# Patient Record
Sex: MALE | Race: WHITE | NOT HISPANIC OR LATINO | Employment: UNEMPLOYED | RURAL
[De-identification: names, ages, dates, MRNs, and addresses within clinical notes are randomized per-mention and may not be internally consistent; named-entity substitution may affect disease eponyms.]

---

## 2017-06-08 ENCOUNTER — OFFICE VISIT (OUTPATIENT)
Dept: RETAIL CLINIC | Facility: CLINIC | Age: 37
End: 2017-06-08

## 2017-06-08 DIAGNOSIS — Z13.9 SCREENING: Primary | ICD-10-CM

## 2017-06-08 NOTE — PROGRESS NOTES
Subjective   Cruz Ponce is a 36 y.o. male.     Reason for Appointment  1. escreen    History of Present Illness  HPI:   See scanned document. See custody control form.    Assessments  1. Encounter for screening, unspecified - Z13.9    This document has been electronically signed by MAN Javier June 8, 2017 9:27 AM

## 2018-02-16 ENCOUNTER — LAB (OUTPATIENT)
Dept: LAB | Facility: HOSPITAL | Age: 38
End: 2018-02-16
Attending: OBSTETRICS & GYNECOLOGY

## 2018-02-16 ENCOUNTER — TRANSCRIBE ORDERS (OUTPATIENT)
Dept: ADMINISTRATIVE | Facility: HOSPITAL | Age: 38
End: 2018-02-16

## 2018-02-16 DIAGNOSIS — N46.9 INFERTILITY MALE: Primary | ICD-10-CM

## 2018-02-16 DIAGNOSIS — N46.9 INFERTILITY MALE: ICD-10-CM

## 2018-02-16 LAB
CHARACTER SMN: ABNORMAL
COLOR SMN: ABNORMAL
FORWARD PROGRESSION: ABNORMAL
ROUND CELLS, SEMINAL FLUID: ABNORMAL /HPF
SPECIMEN VOL SMN: 1.8 ML (ref 2–5)
SPERM # SMN: 21.8 MILLIONS/ML (ref 20–999)
SPERM MOTILE NFR SMN: 57 % MOTILE (ref 41–100)
SPERM PRECURSORS/100 SPERM: 2 /100 SPERM
SPERM SMN: 38 % NORMAL (ref 30–100)
VIABLE CELLS NFR SPEC: ABNORMAL % VIABLE (ref 51–100)
VISC SMN: ABNORMAL CP
WBC/100 SPERM: 2 /100 SPERM

## 2018-02-16 PROCEDURE — 89320 SEMEN ANAL VOL/COUNT/MOT: CPT

## 2019-09-03 ENCOUNTER — OFFICE VISIT (OUTPATIENT)
Dept: NEUROSURGERY | Facility: CLINIC | Age: 39
End: 2019-09-03

## 2019-09-03 VITALS
WEIGHT: 315 LBS | HEIGHT: 72 IN | SYSTOLIC BLOOD PRESSURE: 132 MMHG | TEMPERATURE: 97.1 F | DIASTOLIC BLOOD PRESSURE: 88 MMHG | BODY MASS INDEX: 42.66 KG/M2

## 2019-09-03 DIAGNOSIS — E11.8 TYPE 2 DIABETES MELLITUS WITH COMPLICATION, WITHOUT LONG-TERM CURRENT USE OF INSULIN (HCC): ICD-10-CM

## 2019-09-03 DIAGNOSIS — E66.01 CLASS 3 SEVERE OBESITY DUE TO EXCESS CALORIES WITH SERIOUS COMORBIDITY AND BODY MASS INDEX (BMI) OF 45.0 TO 49.9 IN ADULT (HCC): ICD-10-CM

## 2019-09-03 DIAGNOSIS — G47.30 SLEEP APNEA, UNSPECIFIED TYPE: ICD-10-CM

## 2019-09-03 DIAGNOSIS — M51.36 DEGENERATIVE DISC DISEASE, LUMBAR: ICD-10-CM

## 2019-09-03 DIAGNOSIS — G57.12 MERALGIA PARESTHETICA OF LEFT SIDE: Primary | ICD-10-CM

## 2019-09-03 PROCEDURE — 99203 OFFICE O/P NEW LOW 30 MIN: CPT | Performed by: NEUROLOGICAL SURGERY

## 2019-09-03 RX ORDER — PREGABALIN 75 MG/1
75 CAPSULE ORAL
Qty: 30 CAPSULE | Refills: 0 | Status: SHIPPED | OUTPATIENT
Start: 2019-09-03

## 2019-09-03 RX ORDER — TESTOSTERONE CYPIONATE 200 MG/ML
INJECTION, SOLUTION INTRAMUSCULAR
Refills: 0 | COMMUNITY
Start: 2019-08-20

## 2019-09-03 RX ORDER — ALBUTEROL SULFATE 90 UG/1
AEROSOL, METERED RESPIRATORY (INHALATION)
Refills: 2 | COMMUNITY
Start: 2019-08-23

## 2019-09-03 RX ORDER — IBUPROFEN 600 MG/1
TABLET ORAL
Refills: 1 | COMMUNITY
Start: 2019-08-23

## 2019-09-03 RX ORDER — LISINOPRIL 10 MG/1
TABLET ORAL
Refills: 5 | COMMUNITY
Start: 2019-08-15

## 2019-09-03 NOTE — PROGRESS NOTES
"Cruz Ponce  1980  [id number]      Chief Complaint   Patient presents with   • Back Pain   • Leg Pain     BLE; L>R       HISTORY OF PRESENT ILLNESS: This is a 38-year-old male presenting with a chief complaint of pain in his back with paresthesia and hyperesthesia in his thighs on both sides of 6 to 8 months duration.  Thing climbing stairs walking exercise tend to exacerbate his symptoms.  He is awakened at night with \"pins-and-needles\" in the distribution of the lateral femoral cutaneous nerve.    He also has severe sleep apnea, diabetes continues to smoke.  A lumbar MRI was performed is referred for neurosurgical consultation.    Past Medical History:   Diagnosis Date   • Arthritis    • Emphysema lung (CMS/HCC)    • Hypertension    • Low back pain        Past Surgical History:   Procedure Laterality Date   • NO PAST SURGERIES         Family History   Problem Relation Age of Onset   • COPD Mother    • Heart failure Mother    • Heart failure Father    • Hypertension Father    • Thyroid disease Father        Social History     Socioeconomic History   • Marital status:      Spouse name: Not on file   • Number of children: Not on file   • Years of education: Not on file   • Highest education level: Not on file   Tobacco Use   • Smoking status: Current Every Day Smoker     Packs/day: 0.50   • Smokeless tobacco: Never Used   Substance and Sexual Activity   • Alcohol use: No     Frequency: Never   • Drug use: No   • Sexual activity: Defer       No Known Allergies      Current Outpatient Medications:   •  ANORO ELLIPTA 62.5-25 MCG/INH aerosol powder  inhaler, , Disp: , Rfl: 2  •   MG tablet, , Disp: , Rfl: 1  •  lisinopril (PRINIVIL,ZESTRIL) 10 MG tablet, , Disp: , Rfl: 5  •  metFORMIN (GLUCOPHAGE) 500 MG tablet, , Disp: , Rfl: 0  •  sertraline (ZOLOFT) 50 MG tablet, , Disp: , Rfl: 5  •  Testosterone Cypionate (DEPOTESTOTERONE CYPIONATE) 200 MG/ML injection, , Disp: , Rfl: 0  •  VENTOLIN  (90 " Base) MCG/ACT inhaler, , Disp: , Rfl: 2    Review of Systems   Constitutional: Positive for fatigue. Negative for activity change, appetite change, chills, diaphoresis, fever and unexpected weight change.   HENT: Negative for congestion, dental problem, drooling, ear discharge, ear pain, facial swelling, hearing loss, mouth sores, nosebleeds, postnasal drip, rhinorrhea, sinus pressure, sneezing, sore throat, tinnitus, trouble swallowing and voice change.    Eyes: Negative for photophobia, pain, discharge, redness, itching and visual disturbance.   Respiratory: Positive for apnea, shortness of breath and wheezing. Negative for cough, choking, chest tightness and stridor.    Cardiovascular: Negative for chest pain, palpitations and leg swelling.   Gastrointestinal: Negative for abdominal distention, abdominal pain, anal bleeding, blood in stool, constipation, diarrhea, nausea, rectal pain and vomiting.   Endocrine: Negative for cold intolerance, heat intolerance, polydipsia, polyphagia and polyuria.   Genitourinary: Negative for decreased urine volume, difficulty urinating, dysuria, enuresis, flank pain, frequency, genital sores, hematuria and urgency.   Musculoskeletal: Positive for back pain, myalgias and neck pain. Negative for arthralgias, gait problem, joint swelling and neck stiffness.   Skin: Negative for color change, pallor, rash and wound.   Allergic/Immunologic: Negative for environmental allergies, food allergies and immunocompromised state.   Neurological: Positive for numbness and headaches. Negative for dizziness, tremors, seizures, syncope, facial asymmetry, speech difficulty, weakness and light-headedness.   Hematological: Negative for adenopathy. Does not bruise/bleed easily.   Psychiatric/Behavioral: Negative for agitation, behavioral problems, confusion, decreased concentration, dysphoric mood, hallucinations, self-injury, sleep disturbance and suicidal ideas. The patient is not nervous/anxious and  "is not hyperactive.    All other systems reviewed and are negative.      Vitals:    09/03/19 1218   BP: 132/88   BP Location: Left arm   Patient Position: Sitting   Cuff Size: Large Adult   Temp: 97.1 °F (36.2 °C)   TempSrc: Temporal   Weight: (!) 165 kg (364 lb)   Height: 182.9 cm (72\")       Neurological Examination:    Mental status/speech: The patient is alert and oriented.  Speech is clear without aphysia or dysarthria.  No overt cognitive deficits.    Cranial nerve examination:    Olfaction: Smell is intact.  Vision: Vision is intact without visual field abnormalities.  Funduscopic examination is normal.  No pupillary irregularity.  Ocular motor examination: The extraocular muscles are intact.  There is no diplopia.  The pupil is round and reactive to both light and accommodation.  There is no nystagmus.  Facial movement/sensation: There is no facial weakness.  Sensation is intact in the first, second, and third divisions of the trigeminal nerve.  The corneal reflex is intact.  Auditory: Hearing is intact to finger rub bilaterally.  Cranial nerves IX, X, XI, XII: Phonation is normal.  No dysphagia.  Tongue is protruded in the midline without atrophy.  The gag reflex is intact.  Shoulder shrug is normal.    Musculoligamentous ligamentous examination: He is morbidly obese.  Straight leg raising Lasègue and flip test are negative.  James's test are negative.  There is no weakness sensory loss or reflex asymmetry.  His gait is normal.    Medical Decision Making:     Diagnostic Data Set: The lumbar MRI shows degenerative disc disease L4-L5.  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  He has severe sleep apnea which apparently has " "not been well adjusted or controlled.  Accordingly I have gotten him a \"second opinion\" for his sleep apnea.  He needs referral to bariatric surgery for weight reduction.  I have ordered EMG/NCV and will see him in [place] subsequently.  I have given him a prescription of Lyrica 75 mg at night.  He does not have a neurosurgical abnormality.        I greatly appreciate the opportunity to see and evaluate this individual.  If you have questions or concerns regarding issues that I may have overlooked please call me at any time: [phone].  Ayo Carvalho M.D.  [facility]  [address] [place] [address]  [id number]    Scribed for Hill Carvalho MD by Naz Hernandez CMA. 9/3/2019  12:56 PM     I have read and concur with the information provided by the scribe.  Hill Carvalho MD      "

## 2019-10-14 ENCOUNTER — APPOINTMENT (OUTPATIENT)
Dept: NEUROLOGY | Facility: HOSPITAL | Age: 39
End: 2019-10-14

## 2024-09-23 ENCOUNTER — OFFICE VISIT (OUTPATIENT)
Dept: CARDIOLOGY | Facility: CLINIC | Age: 44
End: 2024-09-23
Payer: MEDICAID

## 2024-09-23 VITALS
WEIGHT: 315 LBS | HEIGHT: 71 IN | SYSTOLIC BLOOD PRESSURE: 152 MMHG | OXYGEN SATURATION: 92 % | DIASTOLIC BLOOD PRESSURE: 83 MMHG | BODY MASS INDEX: 44.1 KG/M2 | HEART RATE: 75 BPM

## 2024-09-23 DIAGNOSIS — G47.30 SLEEP APNEA IN ADULT: ICD-10-CM

## 2024-09-23 DIAGNOSIS — I50.23 ACUTE ON CHRONIC SYSTOLIC CHF (CONGESTIVE HEART FAILURE): ICD-10-CM

## 2024-09-23 DIAGNOSIS — E11.9 TYPE 2 DIABETES MELLITUS WITHOUT COMPLICATION, WITHOUT LONG-TERM CURRENT USE OF INSULIN: ICD-10-CM

## 2024-09-23 DIAGNOSIS — I10 HYPERTENSION, UNSPECIFIED TYPE: Primary | ICD-10-CM

## 2024-09-23 PROBLEM — E66.01 MORBID OBESITY WITH BMI OF 50.0-59.9, ADULT: Status: ACTIVE | Noted: 2024-09-23

## 2024-09-23 PROCEDURE — 93000 ELECTROCARDIOGRAM COMPLETE: CPT | Performed by: INTERNAL MEDICINE

## 2024-09-23 PROCEDURE — 3079F DIAST BP 80-89 MM HG: CPT | Performed by: INTERNAL MEDICINE

## 2024-09-23 PROCEDURE — 3077F SYST BP >= 140 MM HG: CPT | Performed by: INTERNAL MEDICINE

## 2024-09-23 PROCEDURE — 99204 OFFICE O/P NEW MOD 45 MIN: CPT | Performed by: INTERNAL MEDICINE

## 2024-09-23 RX ORDER — SEMAGLUTIDE 0.68 MG/ML
0.5 INJECTION, SOLUTION SUBCUTANEOUS WEEKLY
COMMUNITY
Start: 2024-09-11

## 2024-09-23 RX ORDER — LOSARTAN POTASSIUM 25 MG/1
25 TABLET ORAL DAILY
Qty: 30 TABLET | Refills: 12 | Status: SHIPPED | OUTPATIENT
Start: 2024-09-23

## 2024-12-05 ENCOUNTER — HOSPITAL ENCOUNTER (OUTPATIENT)
Dept: NUCLEAR MEDICINE | Facility: HOSPITAL | Age: 44
Discharge: HOME | End: 2024-12-05
Payer: MEDICAID

## 2024-12-05 ENCOUNTER — HOSPITAL ENCOUNTER (OUTPATIENT)
Dept: CARDIOLOGY | Facility: HOSPITAL | Age: 44
Discharge: HOME | End: 2024-12-05
Payer: MEDICAID

## 2024-12-05 LAB
BH CV ECHO MEAS - AO MAX PG: 10.1 MMHG
BH CV ECHO MEAS - AO MEAN PG: 6 MMHG
BH CV ECHO MEAS - AO ROOT DIAM: 3.5 CM
BH CV ECHO MEAS - AO V2 MAX: 159 CM/SEC
BH CV ECHO MEAS - AO V2 VTI: 34.2 CM
BH CV ECHO MEAS - EDV(CUBED): 185.2 ML
BH CV ECHO MEAS - EDV(MOD-SP2): 81.4 ML
BH CV ECHO MEAS - EDV(MOD-SP4): 126 ML
BH CV ECHO MEAS - EF(MOD-BP): 59.6 %
BH CV ECHO MEAS - EF(MOD-SP2): 37.3 %
BH CV ECHO MEAS - EF(MOD-SP4): 76.6 %
BH CV ECHO MEAS - ESV(CUBED): 46.7 ML
BH CV ECHO MEAS - ESV(MOD-SP2): 51 ML
BH CV ECHO MEAS - ESV(MOD-SP4): 29.5 ML
BH CV ECHO MEAS - FS: 36.8 %
BH CV ECHO MEAS - IVS/LVPW: 0.92 CM
BH CV ECHO MEAS - IVSD: 1.1 CM
BH CV ECHO MEAS - LA DIMENSION: 4.6 CM
BH CV ECHO MEAS - LAT PEAK E' VEL: 10 CM/SEC
BH CV ECHO MEAS - LV DIASTOLIC VOL/BSA (35-75): 46.4 CM2
BH CV ECHO MEAS - LV MASS(C)D: 272.5 GRAMS
BH CV ECHO MEAS - LV SYSTOLIC VOL/BSA (12-30): 10.9 CM2
BH CV ECHO MEAS - LVIDD: 5.7 CM
BH CV ECHO MEAS - LVIDS: 3.6 CM
BH CV ECHO MEAS - LVOT AREA: 3.1 CM2
BH CV ECHO MEAS - LVOT DIAM: 2 CM
BH CV ECHO MEAS - LVPWD: 1.2 CM
BH CV ECHO MEAS - MED PEAK E' VEL: 6.5 CM/SEC
BH CV ECHO MEAS - MV A MAX VEL: 109 CM/SEC
BH CV ECHO MEAS - MV E MAX VEL: 97.6 CM/SEC
BH CV ECHO MEAS - MV E/A: 0.9
BH CV ECHO MEAS - PA ACC TIME: 0.11 SEC
BH CV ECHO MEAS - SV(MOD-SP2): 30.4 ML
BH CV ECHO MEAS - SV(MOD-SP4): 96.5 ML
BH CV ECHO MEAS - SVI(MOD-SP2): 11.2 ML/M2
BH CV ECHO MEAS - SVI(MOD-SP4): 35.5 ML/M2
BH CV ECHO MEAS - TAPSE (>1.6): 2.33 CM
BH CV ECHO MEASUREMENTS AVERAGE E/E' RATIO: 11.83
BH CV NUCLEAR PRIOR STUDY: 3
BH CV REST NUCLEAR ISOTOPE DOSE: 10.1 MCI
BH CV STRESS BP STAGE 1: NORMAL
BH CV STRESS DURATION MIN STAGE 1: 2
BH CV STRESS DURATION SEC STAGE 1: 47
BH CV STRESS GRADE STAGE 1: 10
BH CV STRESS HR STAGE 1: 122
BH CV STRESS METS STAGE 1: 5
BH CV STRESS NUCLEAR ISOTOPE DOSE: 32.5 MCI
BH CV STRESS PROTOCOL 1: NORMAL
BH CV STRESS RECOVERY BP: NORMAL MMHG
BH CV STRESS RECOVERY HR: 85 BPM
BH CV STRESS SPEED STAGE 1: 1.7
BH CV STRESS STAGE 1: 1
LEFT ATRIUM VOLUME INDEX: 18.6 ML/M2
LV EF NUC BP: 64 %
MAXIMAL PREDICTED HEART RATE: 176 BPM
PERCENT MAX PREDICTED HR: 69.32 %
STRESS BASELINE BP: NORMAL MMHG
STRESS BASELINE HR: 96 BPM
STRESS PERCENT HR: 82 %
STRESS POST ESTIMATED WORKLOAD: 4.6 METS
STRESS POST EXERCISE DUR MIN: 2 MIN
STRESS POST EXERCISE DUR SEC: 47 SEC
STRESS POST PEAK BP: NORMAL MMHG
STRESS POST PEAK HR: 122 BPM
STRESS TARGET HR: 150 BPM

## 2024-12-05 PROCEDURE — 78452 HT MUSCLE IMAGE SPECT MULT: CPT

## 2024-12-05 PROCEDURE — 34310000005 TECHNETIUM SESTAMIBI: Performed by: INTERNAL MEDICINE

## 2024-12-05 PROCEDURE — A9500 TC99M SESTAMIBI: HCPCS | Performed by: INTERNAL MEDICINE

## 2024-12-05 PROCEDURE — 25010000002 REGADENOSON 0.4 MG/5ML SOLUTION: Performed by: INTERNAL MEDICINE

## 2024-12-05 PROCEDURE — 93306 TTE W/DOPPLER COMPLETE: CPT

## 2024-12-05 PROCEDURE — 93017 CV STRESS TEST TRACING ONLY: CPT

## 2024-12-05 RX ORDER — REGADENOSON 0.08 MG/ML
0.4 INJECTION, SOLUTION INTRAVENOUS
Status: COMPLETED | OUTPATIENT
Start: 2024-12-05 | End: 2024-12-05

## 2024-12-05 RX ADMIN — TECHNETIUM TC 99M SESTAMIBI 1 DOSE: 1 INJECTION INTRAVENOUS at 11:18

## 2024-12-05 RX ADMIN — TECHNETIUM TC 99M SESTAMIBI 1 DOSE: 1 INJECTION INTRAVENOUS at 12:23

## 2024-12-05 RX ADMIN — REGADENOSON 0.4 MG: 0.08 INJECTION, SOLUTION INTRAVENOUS at 12:23

## 2024-12-06 ENCOUNTER — TELEPHONE (OUTPATIENT)
Dept: CARDIOLOGY | Facility: CLINIC | Age: 44
End: 2024-12-06

## 2024-12-06 NOTE — TELEPHONE ENCOUNTER
Caller: Cruz Ponce    Relationship: Self    Best call back number: [phone]    What is the best time to reach you: ANY    Who are you requesting to speak with (clinical staff, provider,  specific staff member): ANY    Do you know the name of the person who called: FERN    What was the call regarding: PATIENT RECEIVED A CALL TELLING HIM TO CALL BACK TO SPEAK WITH FERN. NO NOTE IN CHART. PLEASE CALL HIM BACK WHEN POSSIBLE TO DISCUSS WHY HE WAS CALLED. THANK YOU    Is it okay if the provider responds through [facility]: PLEASE CALL

## 2025-03-18 ENCOUNTER — OFFICE VISIT (OUTPATIENT)
Dept: CARDIOLOGY | Facility: CLINIC | Age: 45
End: 2025-03-18
Payer: MEDICAID

## 2025-03-18 VITALS
OXYGEN SATURATION: 95 % | SYSTOLIC BLOOD PRESSURE: 146 MMHG | HEART RATE: 71 BPM | BODY MASS INDEX: 44.1 KG/M2 | HEIGHT: 71 IN | DIASTOLIC BLOOD PRESSURE: 85 MMHG | WEIGHT: 315 LBS

## 2025-03-18 DIAGNOSIS — E66.01 MORBID OBESITY WITH BMI OF 50.0-59.9, ADULT: ICD-10-CM

## 2025-03-18 DIAGNOSIS — R60.0 EDEMA LEG: Primary | ICD-10-CM

## 2025-03-18 DIAGNOSIS — G47.30 SLEEP APNEA IN ADULT: ICD-10-CM

## 2025-03-18 DIAGNOSIS — I10 HYPERTENSION, UNSPECIFIED TYPE: ICD-10-CM

## 2025-03-18 DIAGNOSIS — E11.9 TYPE 2 DIABETES MELLITUS WITHOUT COMPLICATION, WITHOUT LONG-TERM CURRENT USE OF INSULIN: ICD-10-CM

## 2025-03-18 RX ORDER — LOSARTAN POTASSIUM 50 MG/1
50 TABLET ORAL DAILY
Qty: 30 TABLET | Refills: 11 | Status: SHIPPED | OUTPATIENT
Start: 2025-03-18

## 2025-03-18 NOTE — PROGRESS NOTES
Progress Note    Subjective     Cruz Ponce is a 44 y.o. male who presents to day for hypertension      Active Problems:  Problem List Items Addressed This Visit          Cardiac and Vasculature    Hypertension    Relevant Medications    losartan (Cozaar) 50 MG tablet       Endocrine and Metabolic    Type 2 diabetes mellitus without complication, without long-term current use of insulin    Morbid obesity with BMI of 50.0-59.9, adult       Sleep    Sleep apnea in adult     Other Visit Diagnoses         Edema leg    -  Primary    Relevant Orders    US Venous Doppler Lower Extremity Left (duplex for insufficiency)            ARIS Arroyo comes in for follow-up with his wife.  He was started on Ozempic and has lost about 50 pounds.  He is thrilled about it.  He continues to smoke although he is trying to quit.  He denies any dizziness or syncopal episodes.  He denies any exertional chest pain or shortness of breath.  He does have chronic lower extremity edema.    PRIOR MEDS  Current Outpatient Medications on File Prior to Visit   Medication Sig Dispense Refill    ANORO ELLIPTA 62.5-25 MCG/INH aerosol powder  inhaler As Needed.  2     MG tablet As Needed.  1    Ozempic, 0.25 or 0.5 MG/DOSE, 2 MG/3ML solution pen-injector Inject 0.5 mg under the skin into the appropriate area as directed 1 (One) Time Per Week.      [DISCONTINUED] losartan (Cozaar) 25 MG tablet Take 1 tablet by mouth Daily. 30 tablet 12    [DISCONTINUED] metFORMIN (GLUCOPHAGE) 500 MG tablet Take 1 tablet by mouth Daily With Breakfast. (Patient not taking: Reported on 3/18/2025)  0     No current facility-administered medications on file prior to visit.       ALLERGIES  Patient has no known allergies.    HISTORY  Past Medical History:   Diagnosis Date    Arthritis     Emphysema lung     Hypertension     Low back pain        Social History     Socioeconomic History    Marital status:    Tobacco Use    Smoking status: Every Day     Current  "packs/day: 1.00     Types: Cigarettes    Smokeless tobacco: Never   Vaping Use    Vaping status: Never Used   Substance and Sexual Activity    Alcohol use: No    Drug use: No    Sexual activity: Defer       Family History   Problem Relation Age of Onset    COPD Mother     Heart failure Mother     Heart failure Father     Hypertension Father     Thyroid disease Father        Review of Systems    Objective     VITALS: /85 (BP Location: Left arm, Patient Position: Sitting, Cuff Size: Large Adult)   Pulse 71   Ht 180.3 cm (71\")   Wt (!) 149 kg (328 lb)   SpO2 95%   BMI 45.75 kg/m²     LABS:   No visits with results within 3 Month(s) from this visit.   Latest known visit with results is:   Office Visit on 09/23/2024   Component Date Value Ref Range Status    EF(MOD-bp) 12/05/2024 59.6  % Final    LVIDd 12/05/2024 5.7  cm Final    LVIDs 12/05/2024 3.6  cm Final    IVSd 12/05/2024 1.10  cm Final    LVPWd 12/05/2024 1.20  cm Final    FS 12/05/2024 36.8  % Final    IVS/LVPW 12/05/2024 0.92  cm Final    ESV(cubed) 12/05/2024 46.7  ml Final    LV Sys Vol (BSA corrected) 12/05/2024 10.9  cm2 Final    EDV(cubed) 12/05/2024 185.2  ml Final    LV Malloy Vol (BSA corrected) 12/05/2024 46.4  cm2 Final    LV mass(C)d 12/05/2024 272.5  grams Final    LVOT area 12/05/2024 3.1  cm2 Final    LVOT diam 12/05/2024 2.00  cm Final    EDV(MOD-sp2) 12/05/2024 81.4  ml Final    EDV(MOD-sp4) 12/05/2024 126.0  ml Final    ESV(MOD-sp2) 12/05/2024 51.0  ml Final    ESV(MOD-sp4) 12/05/2024 29.5  ml Final    SV(MOD-sp2) 12/05/2024 30.4  ml Final    SV(MOD-sp4) 12/05/2024 96.5  ml Final    SVi(MOD-SP2) 12/05/2024 11.2  ml/m2 Final    SVi(MOD-SP4) 12/05/2024 35.5  ml/m2 Final    EF(MOD-sp2) 12/05/2024 37.3  % Final    EF(MOD-sp4) 12/05/2024 76.6  % Final    MV E max elaina 12/05/2024 97.6  cm/sec Final    MV A max elaina 12/05/2024 109.0  cm/sec Final    MV E/A 12/05/2024 0.90   Final    LA ESV Index (BP) 12/05/2024 18.6  ml/m2 Final    Med Peak E' " Enzo 12/05/2024 6.5  cm/sec Final    Lat Peak E' Enzo 12/05/2024 10.0  cm/sec Final    Avg E/e' ratio 12/05/2024 11.83   Final    TAPSE (>1.6) 12/05/2024 2.33  cm Final    LA dimension (2D)  12/05/2024 4.6  cm Final    Ao pk enzo 12/05/2024 159.0  cm/sec Final    Ao max PG 12/05/2024 10.1  mmHg Final    Ao mean PG 12/05/2024 6.0  mmHg Final    Ao V2 VTI 12/05/2024 34.2  cm Final    PA acc time 12/05/2024 0.11  sec Final    Ao root diam 12/05/2024 3.5  cm Final    BH CV REST NUCLEAR ISOTOPE DOSE 12/05/2024 10.1  mCi Final    Nuclear Prior Study 12/05/2024 3.0   Final    BH CV STRESS NUCLEAR ISOTOPE DOSE 12/05/2024 32.5  mCi Final    Exercise duration (min) 12/05/2024 2  min Final    Exercise duration (sec) 12/05/2024 47  sec Final    Estimated workload 12/05/2024 4.6  METS Final     CV STRESS PROTOCOL 1 12/05/2024 Crescencio   Final    Stage 1 12/05/2024 1.0   Final    HR Stage 1 12/05/2024 122   Final    BP Stage 1 12/05/2024 202/92   Final    Duration Min Stage 1 12/05/2024 2   Final    Duration Sec Stage 1 12/05/2024 47   Final    Grade Stage 1 12/05/2024 10   Final    Speed Stage 1 12/05/2024 1.7   Final     CV STRESS METS STAGE 1 12/05/2024 5.0   Final    Baseline HR 12/05/2024 96  bpm Final    Baseline BP 12/05/2024 158/96  mmHg Final    Peak HR 12/05/2024 122  bpm Final    Peak BP 12/05/2024 202/92  mmHg Final    Recovery HR 12/05/2024 85  bpm Final    Recovery BP 12/05/2024 163/85  mmHg Final    Target HR (85%) 12/05/2024 150  bpm Final    Max. Pred. HR (100%) 12/05/2024 176  bpm Final    Percent Max Pred HR 12/05/2024 69.32  % Final    Percent Target HR 12/05/2024 82  % Final    Nuc Stress EF 12/05/2024 64  % Final         IMAGING:   No Images in the past 120 days found..   No results found for this or any previous visit from the past 365 days.   Results for orders placed in visit on 09/23/24    Stress Test With Myocardial Perfusion One Day    Interpretation Summary    Myocardial perfusion imaging indicates a  normal myocardial perfusion study with no evidence of ischemia.    Left ventricular ejection fraction is normal (Calculated EF = 64%).    TID 1.14.    Findings consistent with a normal ECG stress test.        EXAM:  Constitutional:       General: Awake.      Appearance: Not in distress. Morbidly obese.   HENT:      Head: Normocephalic and atraumatic.   Neck:      Vascular: No carotid bruit or JVD.   Pulmonary:      Effort: Pulmonary effort is normal.      Breath sounds: Normal breath sounds.   Cardiovascular:      Normal rate. Regular rhythm. Normal S1. Normal S2.       Murmurs: There is no murmur.      No gallop.  No click. No rub.   Edema:     Peripheral edema present.  Abdominal:      Palpations: Abdomen is soft.      Tenderness: There is no abdominal tenderness.   Neurological:      Mental Status: Alert.   Psychiatric:         Behavior: Behavior is cooperative.          Procedure   Procedures       Assessment & Plan     Diagnoses and all orders for this visit:    1. Edema leg (Primary)  -     US Venous Doppler Lower Extremity Left (duplex for insufficiency); Future    2. Hypertension, unspecified type    3. Type 2 diabetes mellitus without complication, without long-term current use of insulin    4. Morbid obesity with BMI of 50.0-59.9, adult    5. Sleep apnea in adult    Other orders  -     losartan (Cozaar) 50 MG tablet; Take 1 tablet by mouth Daily.  Dispense: 30 tablet; Refill: 11          PLAN  -Increase losartan to 50 mg p.o. daily.  -Venous insufficiency study.  -Continue efforts at quitting smoking.  -RTC in 6 months.           MEDS ORDERED DURING VISIT:    Medications Discontinued During This Encounter   Medication Reason    metFORMIN (GLUCOPHAGE) 500 MG tablet *Therapy completed    losartan (Cozaar) 25 MG tablet         New Medications Ordered This Visit   Medications    losartan (Cozaar) 50 MG tablet     Sig: Take 1 tablet by mouth Daily.     Dispense:  30 tablet     Refill:  11                            Diagnosis Plan   1. Edema leg  US Venous Doppler Lower Extremity Left (duplex for insufficiency)      2. Hypertension, unspecified type        3. Type 2 diabetes mellitus without complication, without long-term current use of insulin        4. Morbid obesity with BMI of 50.0-59.9, adult        5. Sleep apnea in adult            Follow Up:   No follow-ups on file.    Patient was given instructions and counseling regarding his condition or for health maintenance advice. Please see specific information pulled into the AVS if appropriate.   As always, Inez Montiel, DO  I appreciate very much the opportunity to participate in the cardiovascular care of your patients. Please do not hesitate to call me with any questions with regards to Cruz Ponce evaluation and management.         This document has been electronically signed by Pam Miller MD, Interventional Cardiology  March 18, 2025 16:46 EDT    Dictated Utilizing Dragon Dictation: Part of this note may be an electronic transcription/translation of spoken language to printed text using the Dragon Dictation System.

## 2025-03-25 ENCOUNTER — TELEPHONE (OUTPATIENT)
Dept: CARDIOLOGY | Facility: CLINIC | Age: 45
End: 2025-03-25

## 2025-03-25 NOTE — TELEPHONE ENCOUNTER
Caller: LAURA    Relationship: Significant Other    Best call back number: [phone]     What orders are you requesting (i.e. lab or imaging): ECHO (NOT SEEN IN CHART)     In what timeframe would the patient need to come in: N/A     Where will you receive your lab/imaging services: [facility]    Additional notes: COULD NOT DO APPT ON 3/26, THIS WAS PUSHED OUT UNTIL APRIL. ASKING THIS BE SENT TO THE PLACE ABOVE. STATES IF THIS SHOULD BE DONE AT THE HOS PLEASE ADVISE.